# Patient Record
Sex: MALE | Race: WHITE | NOT HISPANIC OR LATINO | URBAN - METROPOLITAN AREA
[De-identification: names, ages, dates, MRNs, and addresses within clinical notes are randomized per-mention and may not be internally consistent; named-entity substitution may affect disease eponyms.]

---

## 2020-07-25 ENCOUNTER — TELEPHONE ENCOUNTER (OUTPATIENT)
Dept: URBAN - METROPOLITAN AREA CLINIC 13 | Facility: CLINIC | Age: 60
End: 2020-07-25

## 2020-07-25 RX ORDER — OMEPRAZOLE 40 MG/1
TAKE (1) CAPSULE TWICE DAILY CAPSULE, DELAYED RELEASE ORAL
Qty: 60 | Refills: 0 | OUTPATIENT
Start: 2015-12-11 | End: 2018-02-22

## 2020-07-25 RX ORDER — DEXLANSOPRAZOLE 60 MG/1
TAKE 1 CAPSULE DAILY CAPSULE, DELAYED RELEASE ORAL
Qty: 30 | Refills: 0 | OUTPATIENT
Start: 2015-11-19 | End: 2015-12-11

## 2020-07-25 RX ORDER — MERCAPTOPURINE 50 MG/1
TAKE 1 TABLET BY MOUTH ONCE DAILY TABLET ORAL
Qty: 30 | Refills: 2 | OUTPATIENT
Start: 2017-11-09 | End: 2019-05-22

## 2020-07-25 RX ORDER — PREDNISONE 10 MG/1
TAKE 1 TABLET DAILY TABLET ORAL
Refills: 0 | OUTPATIENT
End: 2017-11-09

## 2020-07-25 RX ORDER — RABEPRAZOLE SODIUM 20 MG/1
TAKE 1 TABLET DAILY TABLET, DELAYED RELEASE ORAL
Qty: 30 | Refills: 6 | OUTPATIENT
End: 2015-11-18

## 2020-07-25 RX ORDER — MESALAMINE 375 MG/1
TAKE 4 CAPSULES DAILY IN THE MORNING CAPSULE, EXTENDED RELEASE ORAL
Qty: 120 | Refills: 5 | OUTPATIENT
Start: 2015-11-19 | End: 2019-05-01

## 2020-07-25 RX ORDER — MESALAMINE 375 MG/1
TAKE 4 CAPSULES BY MOUTH DAILY IN THE MORNING CAPSULE, EXTENDED RELEASE ORAL
Qty: 360 | Refills: 2 | OUTPATIENT
Start: 2019-05-01 | End: 2020-06-30

## 2020-07-26 ENCOUNTER — TELEPHONE ENCOUNTER (OUTPATIENT)
Dept: URBAN - METROPOLITAN AREA CLINIC 13 | Facility: CLINIC | Age: 60
End: 2020-07-26

## 2020-07-26 RX ORDER — PREDNISONE 5 MG/1
TAKE 2 TABLET DAILY TABLET ORAL
Qty: 60 | Refills: 2 | Status: ACTIVE | COMMUNITY
Start: 2016-06-17

## 2020-07-26 RX ORDER — ATORVASTATIN CALCIUM 40 MG/1
TABLET, FILM COATED ORAL
Refills: 0 | Status: ACTIVE | COMMUNITY
Start: 2017-05-06

## 2020-07-26 RX ORDER — MESALAMINE 1000 MG/1
SUPPOSITORY RECTAL
Qty: 30 | Refills: 0 | Status: ACTIVE | COMMUNITY
Start: 2017-12-16

## 2020-07-26 RX ORDER — DONEPEZIL HYDROCHLORIDE 5 MG/1
TAKE 1 TABLET DAILY AS DIRECTED TABLET, FILM COATED ORAL
Refills: 0 | Status: ACTIVE | COMMUNITY

## 2020-07-26 RX ORDER — HYOSCYAMINE SULFATE 0.12 MG/1
TABLET, ORALLY DISINTEGRATING ORAL
Qty: 90 | Refills: 0 | Status: ACTIVE | COMMUNITY
Start: 2017-07-04

## 2020-07-26 RX ORDER — MESALAMINE 0.38 G/1
TAKE 4 CAPSULES BY MOUTH DAILY IN THE MORNING CAPSULE, EXTENDED RELEASE ORAL
Qty: 360 | Refills: 2 | Status: ACTIVE | COMMUNITY
Start: 2020-06-30

## 2020-07-26 RX ORDER — ICOSAPENT ETHYL 1000 MG/1
CAPSULE ORAL
Refills: 0 | Status: ACTIVE | COMMUNITY
Start: 2017-07-04

## 2020-07-26 RX ORDER — HYOSCYAMINE SULFATE 0.12 MG/1
TABLET, ORALLY DISINTEGRATING ORAL
Qty: 90 | Refills: 0 | Status: ACTIVE | COMMUNITY
Start: 2017-01-29

## 2020-07-26 RX ORDER — DEXLANSOPRAZOLE 60 MG/1
TAKE 1 CAPSULE BY MOUTH ONCE DAILY CAPSULE, DELAYED RELEASE ORAL
Qty: 30 | Refills: 5 | Status: ACTIVE | COMMUNITY
Start: 2016-06-17

## 2020-07-26 RX ORDER — HYDROCORTISONE ACETATE 25 MG/1
USE AS DIRECTED SUPPOSITORY RECTAL
Qty: 12 | Refills: 0 | Status: ACTIVE | COMMUNITY
Start: 2011-11-06

## 2020-07-26 RX ORDER — BENZONATATE 100 MG/1
CAPSULE ORAL
Qty: 60 | Refills: 0 | Status: ACTIVE | COMMUNITY
Start: 2018-01-24

## 2020-07-26 RX ORDER — RABEPRAZOLE SODIUM 20 MG/1
TABLET, DELAYED RELEASE ORAL
Refills: 0 | Status: ACTIVE | COMMUNITY

## 2020-07-26 RX ORDER — OSELTAMIVIR PHOSPHATE 75 MG/1
CAPSULE ORAL
Qty: 10 | Refills: 0 | Status: ACTIVE | COMMUNITY
Start: 2018-01-24

## 2020-07-26 RX ORDER — CIPROFLOXACIN HYDROCHLORIDE 500 MG/1
TABLET, FILM COATED ORAL
Qty: 20 | Refills: 0 | Status: ACTIVE | COMMUNITY
Start: 2017-10-25

## 2020-07-26 RX ORDER — METRONIDAZOLE 500 MG/1
TABLET ORAL
Qty: 30 | Refills: 0 | Status: ACTIVE | COMMUNITY
Start: 2017-10-25

## 2020-07-26 RX ORDER — HYOSCYAMINE SULFATE 0.12 MG/1
TABLET, ORALLY DISINTEGRATING ORAL
Qty: 90 | Refills: 0 | Status: ACTIVE | COMMUNITY
Start: 2017-04-15

## 2020-07-26 RX ORDER — MERCAPTOPURINE 50 MG/1
TAKE 1 TABLET BY MOUTH ONCE DAILY TABLET ORAL
Qty: 30 | Refills: 5 | Status: ACTIVE | COMMUNITY
Start: 2019-05-22

## 2020-07-26 RX ORDER — POLYETHYLENE GLYCOL 3350 AND ELECTROLYTES WITH LEMON FLAVOR 236; 22.74; 6.74; 5.86; 2.97 G/4L; G/4L; G/4L; G/4L; G/4L
POWDER, FOR SOLUTION ORAL
Qty: 255 | Refills: 0 | Status: ACTIVE | COMMUNITY
Start: 2012-08-22

## 2020-07-26 RX ORDER — CLOPIDOGREL 75 MG/1
TABLET ORAL
Refills: 0 | Status: ACTIVE | COMMUNITY
Start: 2017-05-06

## 2020-09-03 ENCOUNTER — TELEPHONE ENCOUNTER (OUTPATIENT)
Dept: URBAN - METROPOLITAN AREA CLINIC 113 | Facility: CLINIC | Age: 60
End: 2020-09-03

## 2020-09-03 VITALS — HEIGHT: 68 IN | BODY MASS INDEX: 22.28 KG/M2 | WEIGHT: 147 LBS

## 2020-09-03 RX ORDER — DONEPEZIL HYDROCHLORIDE 5 MG/1
TAKE 1 TABLET DAILY AS DIRECTED TABLET, FILM COATED ORAL
Refills: 0 | Status: ON HOLD | COMMUNITY

## 2020-09-03 RX ORDER — POLYETHYLENE GLYCOL 3350 AND ELECTROLYTES WITH LEMON FLAVOR 236; 22.74; 6.74; 5.86; 2.97 G/4L; G/4L; G/4L; G/4L; G/4L
POWDER, FOR SOLUTION ORAL
Qty: 255 | Refills: 0 | Status: ON HOLD | COMMUNITY
Start: 2012-08-22

## 2020-09-03 RX ORDER — PREDNISONE 5 MG/1
TAKE 2 TABLET DAILY TABLET ORAL
Qty: 60 | Refills: 2 | Status: ACTIVE | COMMUNITY
Start: 2016-06-17

## 2020-09-03 RX ORDER — HYDROCORTISONE ACETATE 25 MG/1
USE AS DIRECTED SUPPOSITORY RECTAL
Qty: 12 | Refills: 0 | Status: ON HOLD | COMMUNITY
Start: 2011-11-06

## 2020-09-03 RX ORDER — MESALAMINE 1000 MG/1
SUPPOSITORY RECTAL
Qty: 30 | Refills: 0 | Status: ON HOLD | COMMUNITY
Start: 2017-12-16

## 2020-09-03 RX ORDER — CLOPIDOGREL 75 MG/1
TABLET ORAL
Refills: 0 | Status: ACTIVE | COMMUNITY
Start: 2017-05-06

## 2020-09-03 RX ORDER — DEXLANSOPRAZOLE 60 MG/1
TAKE 1 CAPSULE BY MOUTH ONCE DAILY CAPSULE, DELAYED RELEASE ORAL
Qty: 30 | Refills: 5 | Status: ON HOLD | COMMUNITY
Start: 2016-06-17

## 2020-09-03 RX ORDER — RABEPRAZOLE SODIUM 20 MG/1
TABLET, DELAYED RELEASE ORAL
Refills: 0 | Status: ACTIVE | COMMUNITY

## 2020-09-03 RX ORDER — METRONIDAZOLE 500 MG/1
TABLET ORAL
Qty: 30 | Refills: 0 | Status: ON HOLD | COMMUNITY
Start: 2017-10-25

## 2020-09-03 RX ORDER — MERCAPTOPURINE 50 MG/1
TAKE 1 TABLET BY MOUTH ONCE DAILY TABLET ORAL
Qty: 30 | Refills: 5 | Status: ACTIVE | COMMUNITY
Start: 2019-05-22

## 2020-09-03 RX ORDER — MESALAMINE 0.38 G/1
TAKE 4 CAPSULES BY MOUTH DAILY IN THE MORNING CAPSULE, EXTENDED RELEASE ORAL
Qty: 360 | Refills: 2 | Status: ON HOLD | COMMUNITY
Start: 2020-06-30

## 2020-09-03 RX ORDER — CIPROFLOXACIN HYDROCHLORIDE 500 MG/1
TABLET, FILM COATED ORAL
Qty: 20 | Refills: 0 | Status: ON HOLD | COMMUNITY
Start: 2017-10-25

## 2020-09-03 RX ORDER — OSELTAMIVIR PHOSPHATE 75 MG/1
CAPSULE ORAL
Qty: 10 | Refills: 0 | Status: ON HOLD | COMMUNITY
Start: 2018-01-24

## 2020-09-03 RX ORDER — ATORVASTATIN CALCIUM 40 MG/1
TABLET, FILM COATED ORAL
Refills: 0 | Status: ON HOLD | COMMUNITY
Start: 2017-05-06

## 2020-09-03 RX ORDER — EVOLOCUMAB 140 MG/ML
1 ML INJECTION, SOLUTION SUBCUTANEOUS
Status: ACTIVE | COMMUNITY

## 2020-09-03 RX ORDER — BENZONATATE 100 MG/1
CAPSULE ORAL
Qty: 60 | Refills: 0 | Status: ON HOLD | COMMUNITY
Start: 2018-01-24

## 2020-09-03 RX ORDER — ICOSAPENT ETHYL 1000 MG/1
CAPSULE ORAL
Refills: 0 | Status: ACTIVE | COMMUNITY
Start: 2017-07-04

## 2020-09-03 RX ORDER — HYOSCYAMINE SULFATE 0.12 MG/1
TABLET, ORALLY DISINTEGRATING ORAL
Qty: 90 | Refills: 0 | Status: ON HOLD | COMMUNITY
Start: 2017-01-29

## 2020-09-03 RX ORDER — MESALAMINE 375 MG/1
4 CAPSULES IN THE MORNING CAPSULE, EXTENDED RELEASE ORAL ONCE A DAY
Qty: 120 CAPSULE | Status: ACTIVE | COMMUNITY

## 2020-09-16 ENCOUNTER — TELEPHONE ENCOUNTER (OUTPATIENT)
Dept: URBAN - METROPOLITAN AREA CLINIC 113 | Facility: CLINIC | Age: 60
End: 2020-09-16

## 2020-09-16 ENCOUNTER — LAB OUTSIDE AN ENCOUNTER (OUTPATIENT)
Dept: URBAN - METROPOLITAN AREA CLINIC 113 | Facility: CLINIC | Age: 60
End: 2020-09-16

## 2020-09-16 RX ORDER — SODIUM, POTASSIUM,MAG SULFATES 17.5-3.13G
354ML SOLUTION, RECONSTITUTED, ORAL ORAL
Qty: 354 MILLILITER | Refills: 0 | OUTPATIENT
Start: 2020-09-16 | End: 2020-09-17

## 2020-09-17 ENCOUNTER — OFFICE VISIT (OUTPATIENT)
Dept: URBAN - METROPOLITAN AREA SURGERY CENTER 25 | Facility: SURGERY CENTER | Age: 60
End: 2020-09-17
Payer: COMMERCIAL

## 2020-09-17 ENCOUNTER — CLAIMS CREATED FROM THE CLAIM WINDOW (OUTPATIENT)
Dept: URBAN - METROPOLITAN AREA CLINIC 4 | Facility: CLINIC | Age: 60
End: 2020-09-17
Payer: COMMERCIAL

## 2020-09-17 DIAGNOSIS — K51.80 OTHER ULCERATIVE COLITIS WITHOUT COMPLICATIONS: ICD-10-CM

## 2020-09-17 DIAGNOSIS — D12.5 BENIGN NEOPLASM OF SIGMOID COLON: ICD-10-CM

## 2020-09-17 DIAGNOSIS — K63.5 BENIGN COLON POLYP: ICD-10-CM

## 2020-09-17 DIAGNOSIS — K51.00 CHRONIC PANCOLONIC ULCERATIVE COLITIS: ICD-10-CM

## 2020-09-17 PROCEDURE — 45380 COLONOSCOPY AND BIOPSY: CPT | Performed by: INTERNAL MEDICINE

## 2020-09-17 PROCEDURE — 88305 TISSUE EXAM BY PATHOLOGIST: CPT | Performed by: PATHOLOGY

## 2020-09-17 PROCEDURE — G9937 DIG OR SURV COLSCO: HCPCS | Performed by: INTERNAL MEDICINE

## 2020-09-17 PROCEDURE — G8907 PT DOC NO EVENTS ON DISCHARG: HCPCS | Performed by: INTERNAL MEDICINE

## 2020-09-17 PROCEDURE — 45385 COLONOSCOPY W/LESION REMOVAL: CPT | Performed by: INTERNAL MEDICINE

## 2021-02-02 ENCOUNTER — ERX REFILL RESPONSE (OUTPATIENT)
Age: 61
End: 2021-02-02

## 2021-02-02 RX ORDER — MESALAMINE 375 MG/1
TAKE 4 CAPSULES BY MOUTH DAILY IN THE MORNING CAPSULE, EXTENDED RELEASE ORAL
Qty: 360 | Refills: 2 | OUTPATIENT

## 2021-02-02 RX ORDER — MESALAMINE 375 MG/1
TAKE 4 CAPSULES BY MOUTH DAILY IN THE MORNING CAPSULE, EXTENDED RELEASE ORAL
Qty: 360 CAPSULE | Refills: 2 | OUTPATIENT

## 2021-02-03 ENCOUNTER — ERX REFILL RESPONSE (OUTPATIENT)
Age: 61
End: 2021-02-03

## 2021-02-04 ENCOUNTER — ERX REFILL RESPONSE (OUTPATIENT)
Age: 61
End: 2021-02-04

## 2021-02-10 ENCOUNTER — ERX REFILL RESPONSE (OUTPATIENT)
Age: 61
End: 2021-02-10

## 2021-02-10 RX ORDER — MESALAMINE 375 MG/1
TAKE 4 CAPSULES BY MOUTH DAILY IN THE MORNING CAPSULE, EXTENDED RELEASE ORAL
Qty: 360 CAPSULE | Refills: 2 | OUTPATIENT

## 2021-02-10 RX ORDER — MESALAMINE 0.38 G/1
TAKE 4 CAPSULES BY MOUTH DAILY IN THE MORNING CAPSULE, EXTENDED RELEASE ORAL
Qty: 360 | Refills: 2 | OUTPATIENT

## 2021-02-19 ENCOUNTER — ERX REFILL RESPONSE (OUTPATIENT)
Age: 61
End: 2021-02-19

## 2021-02-19 RX ORDER — MESALAMINE 375 MG/1
TAKE 4 CAPSULES BY MOUTH DAILY IN THE MORNING CAPSULE, EXTENDED RELEASE ORAL
Qty: 360 CAPSULE | Refills: 2 | OUTPATIENT

## 2021-02-19 RX ORDER — MESALAMINE 0.38 G/1
TAKE 4 CAPSULES BY MOUTH DAILY IN THE MORNING CAPSULE, EXTENDED RELEASE ORAL
Qty: 360 | Refills: 2 | OUTPATIENT

## 2021-03-02 ENCOUNTER — TELEPHONE ENCOUNTER (OUTPATIENT)
Dept: URBAN - METROPOLITAN AREA CLINIC 23 | Facility: CLINIC | Age: 61
End: 2021-03-02

## 2021-03-02 RX ORDER — MESALAMINE 375 MG/1
TAKE 4 CAPSULES BY MOUTH DAILY IN THE MORNING CAPSULE, EXTENDED RELEASE ORAL
Qty: 360 CAPSULE | Refills: 2

## 2021-03-08 ENCOUNTER — ERX REFILL RESPONSE (OUTPATIENT)
Age: 61
End: 2021-03-08

## 2021-03-11 ENCOUNTER — TELEPHONE ENCOUNTER (OUTPATIENT)
Dept: URBAN - METROPOLITAN AREA CLINIC 113 | Facility: CLINIC | Age: 61
End: 2021-03-11

## 2021-03-11 RX ORDER — MESALAMINE 375 MG/1
4 CAPSULES IN THE MORNING CAPSULE, EXTENDED RELEASE ORAL ONCE A DAY
Qty: 360 CAPSULE | Refills: 3 | OUTPATIENT
End: 2022-03-06

## 2021-03-11 RX ORDER — MESALAMINE 375 MG/1
TAKE 4 CAPSULES BY MOUTH DAILY IN THE MORNING CAPSULE, EXTENDED RELEASE ORAL
Refills: 2
End: 2021-12-06

## 2021-03-15 ENCOUNTER — ERX REFILL RESPONSE (OUTPATIENT)
Age: 61
End: 2021-03-15

## 2021-03-15 RX ORDER — MESALAMINE 375 MG/1
TAKE 4 CAPSULES BY MOUTH EVERY MORNING CAPSULE, EXTENDED RELEASE ORAL
Qty: 360 CAPSULE | Refills: 2 | OUTPATIENT

## 2021-03-15 RX ORDER — MESALAMINE 375 MG/1
TAKE 4 CAPSULES BY MOUTH EVERY MORNING CAPSULE, EXTENDED RELEASE ORAL
Qty: 360 | Refills: 2 | OUTPATIENT

## 2021-03-18 ENCOUNTER — ERX REFILL RESPONSE (OUTPATIENT)
Dept: URBAN - METROPOLITAN AREA CLINIC 113 | Facility: CLINIC | Age: 61
End: 2021-03-18

## 2021-03-18 ENCOUNTER — ERX REFILL RESPONSE (OUTPATIENT)
Age: 61
End: 2021-03-18

## 2021-03-18 RX ORDER — MESALAMINE 0.38 G/1
TAKE 4 CAPSULES BY MOUTH EVERY MORNING CAPSULE, EXTENDED RELEASE ORAL
Qty: 360 CAPSULE | Refills: 3 | OUTPATIENT

## 2021-03-18 RX ORDER — MESALAMINE 375 MG/1
4 CAPSULES IN THE MORNING CAPSULE, EXTENDED RELEASE ORAL ONCE A DAY
Qty: 360 | Refills: 3 | OUTPATIENT

## 2021-05-20 ENCOUNTER — OFFICE VISIT (OUTPATIENT)
Dept: URBAN - METROPOLITAN AREA CLINIC 113 | Facility: CLINIC | Age: 61
End: 2021-05-20
Payer: COMMERCIAL

## 2021-05-20 DIAGNOSIS — D12.5 BENIGN NEOPLASM OF SIGMOID COLON: ICD-10-CM

## 2021-05-20 DIAGNOSIS — K51.80 OTHER ULCERATIVE COLITIS WITHOUT COMPLICATIONS: ICD-10-CM

## 2021-05-20 DIAGNOSIS — K22.70 BARRETT'S ESOPHAGUS DETERMINED BY ENDOSCOPY: ICD-10-CM

## 2021-05-20 DIAGNOSIS — R07.89 ATYPICAL CHEST PAIN: ICD-10-CM

## 2021-05-20 DIAGNOSIS — K21.9 GERD WITHOUT ESOPHAGITIS: ICD-10-CM

## 2021-05-20 PROCEDURE — 99214 OFFICE O/P EST MOD 30 MIN: CPT | Performed by: INTERNAL MEDICINE

## 2021-05-20 RX ORDER — DONEPEZIL HYDROCHLORIDE 5 MG/1
1 TABLET ON THE TONGUE AND ALLOW TO DISSOLVE AT BEDTIME TABLET, FILM COATED ORAL ONCE A DAY
Refills: 0 | Status: ACTIVE | COMMUNITY

## 2021-05-20 RX ORDER — BENZONATATE 100 MG/1
CAPSULE ORAL
Qty: 60 | Refills: 0 | Status: DISCONTINUED | COMMUNITY
Start: 2018-01-24

## 2021-05-20 RX ORDER — CLOPIDOGREL 75 MG/1
1 TABLET TABLET ORAL ONCE A DAY
Refills: 0 | Status: ACTIVE | COMMUNITY
Start: 2017-05-06

## 2021-05-20 RX ORDER — MERCAPTOPURINE 50 MG/1
TAKE 1 TABLET BY MOUTH ONCE DAILY TABLET ORAL
Qty: 30 | Refills: 5 | Status: ACTIVE | COMMUNITY
Start: 2019-05-22

## 2021-05-20 RX ORDER — HYOSCYAMINE SULFATE 0.12 MG/1
TABLET, ORALLY DISINTEGRATING ORAL
Qty: 90 | Refills: 0 | Status: DISCONTINUED | COMMUNITY
Start: 2017-01-29

## 2021-05-20 RX ORDER — CIPROFLOXACIN HYDROCHLORIDE 500 MG/1
TABLET, FILM COATED ORAL
Qty: 20 | Refills: 0 | Status: DISCONTINUED | COMMUNITY
Start: 2017-10-25

## 2021-05-20 RX ORDER — HYDROCORTISONE ACETATE 25 MG/1
USE AS DIRECTED SUPPOSITORY RECTAL
Qty: 12 | Refills: 0 | Status: DISCONTINUED | COMMUNITY
Start: 2011-11-06

## 2021-05-20 RX ORDER — RABEPRAZOLE SODIUM 20 MG/1
1 TABLET TABLET, DELAYED RELEASE ORAL ONCE A DAY
Refills: 0 | Status: ACTIVE | COMMUNITY

## 2021-05-20 RX ORDER — METRONIDAZOLE 500 MG/1
TABLET ORAL
Qty: 30 | Refills: 0 | Status: DISCONTINUED | COMMUNITY
Start: 2017-10-25

## 2021-05-20 RX ORDER — MESALAMINE 375 MG/1
TAKE 4 CAPSULES BY MOUTH DAILY IN THE MORNING CAPSULE, EXTENDED RELEASE ORAL
Qty: 360 CAPSULE | Refills: 2 | Status: DISCONTINUED | COMMUNITY

## 2021-05-20 RX ORDER — ATORVASTATIN CALCIUM 40 MG/1
1 TABLET TABLET, FILM COATED ORAL ONCE A DAY
Refills: 0 | Status: DISCONTINUED | COMMUNITY
Start: 2017-05-06

## 2021-05-20 RX ORDER — MESALAMINE 1000 MG/1
SUPPOSITORY RECTAL
Qty: 30 | Refills: 0 | Status: DISCONTINUED | COMMUNITY
Start: 2017-12-16

## 2021-05-20 RX ORDER — PREDNISONE 5 MG/1
TAKE 2 TABLET DAILY TABLET ORAL
Qty: 60 | Refills: 2 | Status: DISCONTINUED | COMMUNITY
Start: 2016-06-17

## 2021-05-20 RX ORDER — POLYETHYLENE GLYCOL 3350 AND ELECTROLYTES WITH LEMON FLAVOR 236; 22.74; 6.74; 5.86; 2.97 G/4L; G/4L; G/4L; G/4L; G/4L
POWDER, FOR SOLUTION ORAL
Qty: 255 | Refills: 0 | Status: DISCONTINUED | COMMUNITY
Start: 2012-08-22

## 2021-05-20 RX ORDER — ICOSAPENT ETHYL 1000 MG/1
CAPSULE ORAL
Refills: 0 | Status: DISCONTINUED | COMMUNITY
Start: 2017-07-04

## 2021-05-20 RX ORDER — EVOLOCUMAB 140 MG/ML
1 ML INJECTION, SOLUTION SUBCUTANEOUS EVERY 2 WEEKS
Status: ACTIVE | COMMUNITY

## 2021-05-20 RX ORDER — MESALAMINE 0.38 G/1
TAKE 4 CAPSULES BY MOUTH EVERY MORNING CAPSULE, EXTENDED RELEASE ORAL
Qty: 360 CAPSULE | Refills: 3 | Status: ACTIVE | COMMUNITY

## 2021-05-20 RX ORDER — OSELTAMIVIR PHOSPHATE 75 MG/1
CAPSULE ORAL
Qty: 10 | Refills: 0 | Status: DISCONTINUED | COMMUNITY
Start: 2018-01-24

## 2021-05-20 RX ORDER — MESALAMINE 375 MG/1
TAKE 4 CAPSULES BY MOUTH DAILY IN THE MORNING CAPSULE, EXTENDED RELEASE ORAL
Refills: 2 | Status: DISCONTINUED | COMMUNITY
End: 2021-12-06

## 2021-05-20 RX ORDER — DEXLANSOPRAZOLE 60 MG/1
TAKE 1 CAPSULE BY MOUTH ONCE DAILY CAPSULE, DELAYED RELEASE ORAL
Qty: 30 | Refills: 5 | Status: DISCONTINUED | COMMUNITY
Start: 2016-06-17

## 2021-05-20 NOTE — HPI-TODAY'S VISIT:
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 He was last seen here on November 9, 2017 after a 2 year absence.  In 2015, Dr. Tinajero had been placed on a regimen of Apriso 0.375 mg 4 capsules daily. He had previously not been on a maintenance regimen for several years, and had been self-medicating on an as-needed basis with prednisone. We repeated his colonoscopy on December 3, 2015 and this showed inflammation from the anus to the ascending colon with biopsy showing no dysplasia. He did have 1 tubular adenoma removed at that time. Upper endoscopy was also done on December 3, 2015 and this showed a hiatal hernia and gastritis. These findings were consistent with those from his prior upper endoscopy in 2005.  Notably, the patient had several coronary artery stents placed by Dr. Singh in May 2017 and is now on Plavix. Of note, his mother does have a history of coronary artery disease and had a myocardial infarction in the past.   In early October 2017, the patient developed acute left-sided abdominal pain which was severe, and a CT scan showed inflammation in the left lower quadrant with an abscess. This was felt to be due to a localized perforation. The abscess was not drained, and the patient was placed on ciprofloxacin and Flagyl. Repeat CT scan on November 9, 2017 showed virtual resolution of that abscess. He felt much better at that time. We discussed biologic agents, immunosuppressives, etc. We did discuss possible surgical intervention (segmental surgical colectomy by Dr. Santos) as well. We also talked about the need to get a surveillance colonoscopy, although we elected to defer this until he was a few months further out from his coronary event. We did give him a lab slip to have lab work performed, but this laboratory was not done. Ultimately, we elected to place him on 6-mercaptopurine 50 mg a day. He called me in the interim with complaints of left lower quadrant pain. A repeat CT scan showed colitis, but no abscess. I put him back on prednisone 30 mg a day, and his symptoms completely abated.   The patient had a surveillance colonoscopy Performed on September 17, 2020.  This showed a 6 mm hyperplastic polyp in the sigmoid, pseudopolyps in the hepatic flexure, sigmoid colon diverticulosis, but no active colitis.  Random colon biopsies showed patchy mildly active colitis without dysplasia.  The patient recently has been having episodes of atypical chest pain and epigastric pain which are quite severe.  He had another episode of this type pain 2 weeks ago, and underwent cardiac catheterization by Dr. Pollack.  All of his stents were open, and there is no evidence of any significant active coronary artery disease.  He did have some right wrist pain after the cardiac catheterization, which is gradually resolving.  He is currently maintained on 6-MP 50 mg daily and Apriso, and this has been successful in maintaining remission.  He takes AcipHex 20 mg twice daily for reflux.  His last upper endoscopy for [facility]ova's surveillance was in 2017, and he would like to schedule a repeat upper endoscopy at this point.

## 2021-05-27 ENCOUNTER — OFFICE VISIT (OUTPATIENT)
Dept: URBAN - METROPOLITAN AREA SURGERY CENTER 25 | Facility: SURGERY CENTER | Age: 61
End: 2021-05-27
Payer: COMMERCIAL

## 2021-05-27 ENCOUNTER — CLAIMS CREATED FROM THE CLAIM WINDOW (OUTPATIENT)
Dept: URBAN - METROPOLITAN AREA CLINIC 4 | Facility: CLINIC | Age: 61
End: 2021-05-27
Payer: COMMERCIAL

## 2021-05-27 DIAGNOSIS — K21.9 GASTRO-ESOPHAGEAL REFLUX DISEASE WITHOUT ESOPHAGITIS: ICD-10-CM

## 2021-05-27 DIAGNOSIS — K22.70 BARRETT ESOPHAGUS: ICD-10-CM

## 2021-05-27 DIAGNOSIS — K21.9 ACID REFLUX: ICD-10-CM

## 2021-05-27 PROCEDURE — 43239 EGD BIOPSY SINGLE/MULTIPLE: CPT | Performed by: INTERNAL MEDICINE

## 2021-05-27 PROCEDURE — G8907 PT DOC NO EVENTS ON DISCHARG: HCPCS | Performed by: INTERNAL MEDICINE

## 2021-05-27 PROCEDURE — 88305 TISSUE EXAM BY PATHOLOGIST: CPT | Performed by: PATHOLOGY

## 2021-05-27 RX ORDER — MESALAMINE 0.38 G/1
TAKE 4 CAPSULES BY MOUTH EVERY MORNING CAPSULE, EXTENDED RELEASE ORAL
Qty: 360 CAPSULE | Refills: 3 | Status: ACTIVE | COMMUNITY

## 2021-05-27 RX ORDER — RABEPRAZOLE SODIUM 20 MG/1
1 TABLET TABLET, DELAYED RELEASE ORAL ONCE A DAY
Refills: 0 | Status: ACTIVE | COMMUNITY

## 2021-05-27 RX ORDER — MERCAPTOPURINE 50 MG/1
TAKE 1 TABLET BY MOUTH ONCE DAILY TABLET ORAL
Qty: 30 | Refills: 5 | Status: ACTIVE | COMMUNITY
Start: 2019-05-22

## 2021-05-27 RX ORDER — EVOLOCUMAB 140 MG/ML
1 ML INJECTION, SOLUTION SUBCUTANEOUS EVERY 2 WEEKS
Status: ACTIVE | COMMUNITY

## 2021-05-27 RX ORDER — CLOPIDOGREL 75 MG/1
1 TABLET TABLET ORAL ONCE A DAY
Refills: 0 | Status: ACTIVE | COMMUNITY
Start: 2017-05-06

## 2021-05-27 RX ORDER — DONEPEZIL HYDROCHLORIDE 5 MG/1
1 TABLET ON THE TONGUE AND ALLOW TO DISSOLVE AT BEDTIME TABLET, FILM COATED ORAL ONCE A DAY
Refills: 0 | Status: ACTIVE | COMMUNITY

## 2021-07-19 ENCOUNTER — OFFICE VISIT (OUTPATIENT)
Dept: URBAN - METROPOLITAN AREA CLINIC 113 | Facility: CLINIC | Age: 61
End: 2021-07-19

## 2021-07-19 RX ORDER — MERCAPTOPURINE 50 MG/1
TAKE 1 TABLET BY MOUTH ONCE DAILY TABLET ORAL
Qty: 30 | Refills: 5 | Status: ACTIVE | COMMUNITY
Start: 2019-05-22

## 2021-07-19 RX ORDER — MESALAMINE 0.38 G/1
TAKE 4 CAPSULES BY MOUTH EVERY MORNING CAPSULE, EXTENDED RELEASE ORAL
Qty: 360 CAPSULE | Refills: 3 | Status: ACTIVE | COMMUNITY

## 2021-07-19 RX ORDER — CLOPIDOGREL 75 MG/1
1 TABLET TABLET ORAL ONCE A DAY
Refills: 0 | Status: ACTIVE | COMMUNITY
Start: 2017-05-06

## 2021-07-19 RX ORDER — EVOLOCUMAB 140 MG/ML
1 ML INJECTION, SOLUTION SUBCUTANEOUS EVERY 2 WEEKS
Status: ACTIVE | COMMUNITY

## 2021-07-19 RX ORDER — RABEPRAZOLE SODIUM 20 MG/1
1 TABLET TABLET, DELAYED RELEASE ORAL ONCE A DAY
Refills: 0 | Status: ACTIVE | COMMUNITY

## 2021-07-19 RX ORDER — DONEPEZIL HYDROCHLORIDE 5 MG/1
1 TABLET ON THE TONGUE AND ALLOW TO DISSOLVE AT BEDTIME TABLET, FILM COATED ORAL ONCE A DAY
Refills: 0 | Status: ACTIVE | COMMUNITY

## 2021-08-11 ENCOUNTER — ERX REFILL RESPONSE (OUTPATIENT)
Dept: URBAN - METROPOLITAN AREA CLINIC 113 | Facility: CLINIC | Age: 61
End: 2021-08-11

## 2021-08-11 RX ORDER — MESALAMINE 375 MG/1
TAKE 4 CAPSULES BY MOUTH DAILY IN THE MORNING CAPSULE, EXTENDED RELEASE ORAL
Qty: 360 CAPSULE | Refills: 2 | OUTPATIENT

## 2022-01-22 ENCOUNTER — ERX REFILL RESPONSE (OUTPATIENT)
Dept: URBAN - METROPOLITAN AREA CLINIC 113 | Facility: CLINIC | Age: 62
End: 2022-01-22

## 2022-01-22 RX ORDER — MESALAMINE 375 MG/1
TAKE 4 CAPSULES BY MOUTH EVERY MORNING CAPSULE, EXTENDED RELEASE ORAL
Qty: 360 CAPSULE | Refills: 3 | OUTPATIENT

## 2022-10-18 ENCOUNTER — ERX REFILL RESPONSE (OUTPATIENT)
Dept: URBAN - METROPOLITAN AREA CLINIC 113 | Facility: CLINIC | Age: 62
End: 2022-10-18

## 2022-10-18 RX ORDER — MESALAMINE 0.38 G/1
TAKE 4 CAPSULES BY MOUTH EVERY MORNING CAPSULE, EXTENDED RELEASE ORAL
Qty: 360 CAPSULE | Refills: 2 | OUTPATIENT

## 2022-10-18 RX ORDER — MESALAMINE 375 MG/1
TAKE 4 CAPSULES BY MOUTH EVERY MORNING CAPSULE, EXTENDED RELEASE ORAL
Qty: 360 CAPSULE | Refills: 3 | OUTPATIENT

## 2022-11-17 ENCOUNTER — WEB ENCOUNTER (OUTPATIENT)
Dept: URBAN - METROPOLITAN AREA CLINIC 113 | Facility: CLINIC | Age: 62
End: 2022-11-17

## 2022-11-17 ENCOUNTER — OFFICE VISIT (OUTPATIENT)
Dept: URBAN - METROPOLITAN AREA CLINIC 113 | Facility: CLINIC | Age: 62
End: 2022-11-17
Payer: COMMERCIAL

## 2022-11-17 ENCOUNTER — LAB OUTSIDE AN ENCOUNTER (OUTPATIENT)
Dept: URBAN - METROPOLITAN AREA CLINIC 113 | Facility: CLINIC | Age: 62
End: 2022-11-17

## 2022-11-17 VITALS
DIASTOLIC BLOOD PRESSURE: 70 MMHG | SYSTOLIC BLOOD PRESSURE: 104 MMHG | BODY MASS INDEX: 24.1 KG/M2 | WEIGHT: 159 LBS | RESPIRATION RATE: 20 BRPM | HEIGHT: 68 IN | TEMPERATURE: 97.8 F | HEART RATE: 59 BPM

## 2022-11-17 DIAGNOSIS — R07.89 ATYPICAL CHEST PAIN: ICD-10-CM

## 2022-11-17 DIAGNOSIS — K21.9 GERD WITHOUT ESOPHAGITIS: ICD-10-CM

## 2022-11-17 DIAGNOSIS — D12.5 BENIGN NEOPLASM OF SIGMOID COLON: ICD-10-CM

## 2022-11-17 DIAGNOSIS — K51.80 OTHER ULCERATIVE COLITIS WITHOUT COMPLICATIONS: ICD-10-CM

## 2022-11-17 DIAGNOSIS — K22.70 BARRETT'S ESOPHAGUS DETERMINED BY ENDOSCOPY: ICD-10-CM

## 2022-11-17 PROBLEM — 302914006: Status: ACTIVE | Noted: 2021-05-20

## 2022-11-17 PROBLEM — 266435005: Status: ACTIVE | Noted: 2021-05-20

## 2022-11-17 PROCEDURE — 99214 OFFICE O/P EST MOD 30 MIN: CPT | Performed by: INTERNAL MEDICINE

## 2022-11-17 RX ORDER — EVOLOCUMAB 140 MG/ML
1 ML INJECTION, SOLUTION SUBCUTANEOUS EVERY 2 WEEKS
Status: ACTIVE | COMMUNITY

## 2022-11-17 RX ORDER — MESALAMINE 0.38 G/1
TAKE 4 CAPSULES BY MOUTH EVERY MORNING CAPSULE, EXTENDED RELEASE ORAL
Qty: 360 CAPSULE | Refills: 2 | Status: ACTIVE | COMMUNITY

## 2022-11-17 RX ORDER — RABEPRAZOLE SODIUM 20 MG/1
1 TABLET TABLET, DELAYED RELEASE ORAL TWICE A DAY
Refills: 0 | Status: ACTIVE | COMMUNITY

## 2022-11-17 RX ORDER — DONEPEZIL HYDROCHLORIDE 5 MG/1
1 TABLET ON THE TONGUE AND ALLOW TO DISSOLVE AT BEDTIME TABLET, FILM COATED ORAL ONCE A DAY
Refills: 0 | Status: ACTIVE | COMMUNITY

## 2022-11-17 RX ORDER — CLOPIDOGREL 75 MG/1
1 TABLET TABLET ORAL ONCE A DAY
Refills: 0 | Status: ACTIVE | COMMUNITY
Start: 2017-05-06

## 2022-11-17 RX ORDER — BEMPEDOIC ACID AND EZETIMIBE 180; 10 MG/1; MG/1
1 TABLET TABLET, FILM COATED ORAL ONCE A DAY
Status: ACTIVE | COMMUNITY

## 2022-11-17 RX ORDER — MERCAPTOPURINE 50 MG/1
TAKE 1 TABLET BY MOUTH ONCE DAILY TABLET ORAL
Qty: 30 | Refills: 5 | Status: ACTIVE | COMMUNITY
Start: 2019-05-22

## 2022-11-17 NOTE — HPI-TODAY'S VISIT:
Reese Tinajero is a 62-year-old  male with history of left sided ulcerative colitis (versus Crohn's colitis) initially diagnosed in the 1990s who returns to the office today for follow-up.  He wa slast seen here in a telemedicine visit on 5/20/21.    He was previously seen here on November 9, 2017 after a 2 year absence.  In 2015, DrJr Tinajero had been placed on a regimen of Apriso 0.375 mg 4 capsules daily. He had previously not been on a maintenance regimen for several years, and had been self-medicating on an as-needed basis with prednisone. We repeated his colonoscopy on December 3, 2015 and this showed inflammation from the anus to the ascending colon with biopsy showing no dysplasia. He did have 1 tubular adenoma removed at that time. Upper endoscopy was also done on December 3, 2015 and this showed a hiatal hernia and gastritis. These findings were consistent with those from his prior upper endoscopy in 2005.  Notably, the patient had several coronary artery stents placed by Dr. Singh in May 2017 and is now on Plavix.  His mother does have a history of coronary artery disease and had a myocardial infarction in the past.   In early October 2017, the patient developed acute left-sided abdominal pain which was severe, and a CT scan showed inflammation in the left lower quadrant with an abscess. This was felt to be due to a localized perforation. The abscess was not drained, and the patient was placed on ciprofloxacin and Flagyl. Repeat CT scan on November 9, 2017 showed virtual resolution of that abscess. He felt much better at that time. We discussed biologic agents, immunosuppressives, etc. We did discuss possible surgical intervention (segmental surgical colectomy by Dr. Santos) as well. We also talked about the need to get a surveillance colonoscopy, although we elected to defer this until he was a few months further out from his coronary event. We did give him a lab slip to have lab work performed, but this laboratory was not done. Ultimately, we elected to place him on 6-mercaptopurine 50 mg a day. He called me in the interim with complaints of left lower quadrant pain. A repeat CT scan showed colitis, but no abscess. I put him back on prednisone 30 mg a day, and his symptoms completely abated.   The patient had a surveillance colonoscopy performed on September 17, 2020.  This showed a 6 mm hyperplastic polyp in the sigmoid, pseudopolyps in the hepatic flexure, sigmoid colon diverticulosis, but no active colitis.  Random colon biopsies showed patchy mildly active colitis without dysplasia.  At the time of his 5/20/221 visit, he had been having episodes of atypical chest and epigastric pain which were quite severe.  He had cardiac catheterization by Dr. Nowak and all of his stents were open; there was no evidence of significant active coronary artery disease.    He was being currently maintained on 6-MP 50 mg daily and Apriso, and this regimen was successful in maintaining remission.  He ws taking AcipHex 20 mg twice daily for reflux.  His last upper endoscopy for [facility]'s surveillance had been in 2017, so he was scheduled for a repeat upper endoscopy. This was done on 5/27/21 and showed an irregula rZ-line; biopsies hsowed reflux-type changes bu no Cordova's and no dysplasia.   He did not come for his post-procedure follow up visit.  The patient recently developed lower abdominal pain, and was noted to have a white blood cell count of 16,000 with a left shift.  He was given IV fluids, a gram of ceftriaxone IV, and was placed on oral ciprofloxacin and Flagyl.  A CT scan of the abdomen and pelvis showed thickening of the cecal area focally, consistent with the patient's abdominal pain complaints.  The possibility of Crohn's was raised, although neoplasm not excluded.  He is currently doing better.  He has minimal abdominal pain at present, no diarrhea, no fever, chills, or sweats.  He did stop taking the Flagyl after yesterday's dose because of a metallic taste in his mouth and other mild GI side effects.  He is still taking Cipro.

## 2023-01-04 PROBLEM — 64766004 ULCERATIVE COLITIS: Status: ACTIVE | Noted: 2021-05-20

## 2023-02-01 ENCOUNTER — OUT OF OFFICE VISIT (OUTPATIENT)
Dept: URBAN - METROPOLITAN AREA SURGERY CENTER 25 | Facility: SURGERY CENTER | Age: 63
End: 2023-02-01

## 2023-02-01 ENCOUNTER — CLAIMS CREATED FROM THE CLAIM WINDOW (OUTPATIENT)
Dept: URBAN - METROPOLITAN AREA CLINIC 4 | Facility: CLINIC | Age: 63
End: 2023-02-01
Payer: COMMERCIAL

## 2023-02-01 ENCOUNTER — CLAIMS CREATED FROM THE CLAIM WINDOW (OUTPATIENT)
Dept: URBAN - METROPOLITAN AREA SURGERY CENTER 25 | Facility: SURGERY CENTER | Age: 63
End: 2023-02-01
Payer: COMMERCIAL

## 2023-02-01 ENCOUNTER — TELEPHONE ENCOUNTER (OUTPATIENT)
Dept: URBAN - METROPOLITAN AREA CLINIC 113 | Facility: CLINIC | Age: 63
End: 2023-02-01

## 2023-02-01 DIAGNOSIS — K63.5 HYPERPLASTIC POLYP OF SIGMOID COLON: ICD-10-CM

## 2023-02-01 DIAGNOSIS — K63.89 OTHER SPECIFIED DISEASES OF INTESTINE: ICD-10-CM

## 2023-02-01 DIAGNOSIS — K52.3 COLITIS, INDETERMINATE: ICD-10-CM

## 2023-02-01 PROCEDURE — 88305 TISSUE EXAM BY PATHOLOGIST: CPT | Performed by: PATHOLOGY

## 2023-02-01 PROCEDURE — 45385 COLONOSCOPY W/LESION REMOVAL: CPT | Performed by: INTERNAL MEDICINE

## 2023-02-01 PROCEDURE — G8907 PT DOC NO EVENTS ON DISCHARG: HCPCS | Performed by: INTERNAL MEDICINE

## 2023-02-01 PROCEDURE — 45380 COLONOSCOPY AND BIOPSY: CPT | Performed by: INTERNAL MEDICINE

## 2023-02-01 RX ORDER — MESALAMINE 0.38 G/1
TAKE 4 CAPSULES BY MOUTH EVERY MORNING CAPSULE, EXTENDED RELEASE ORAL
Qty: 360 CAPSULE | Refills: 2 | Status: ACTIVE | COMMUNITY

## 2023-02-01 RX ORDER — EVOLOCUMAB 140 MG/ML
1 ML INJECTION, SOLUTION SUBCUTANEOUS EVERY 2 WEEKS
Status: ACTIVE | COMMUNITY

## 2023-02-01 RX ORDER — MERCAPTOPURINE 50 MG/1
TAKE 1 TABLET BY MOUTH ONCE DAILY TABLET ORAL
Qty: 30 | Refills: 5 | Status: ACTIVE | COMMUNITY
Start: 2019-05-22

## 2023-02-01 RX ORDER — BEMPEDOIC ACID AND EZETIMIBE 180; 10 MG/1; MG/1
1 TABLET TABLET, FILM COATED ORAL ONCE A DAY
Status: ACTIVE | COMMUNITY

## 2023-02-01 RX ORDER — DONEPEZIL HYDROCHLORIDE 5 MG/1
1 TABLET ON THE TONGUE AND ALLOW TO DISSOLVE AT BEDTIME TABLET, FILM COATED ORAL ONCE A DAY
Refills: 0 | Status: ACTIVE | COMMUNITY

## 2023-02-01 RX ORDER — CLOPIDOGREL 75 MG/1
1 TABLET TABLET ORAL ONCE A DAY
Refills: 0 | Status: ACTIVE | COMMUNITY
Start: 2017-05-06

## 2023-02-01 RX ORDER — RABEPRAZOLE SODIUM 20 MG/1
1 TABLET TABLET, DELAYED RELEASE ORAL TWICE A DAY
Refills: 0 | Status: ACTIVE | COMMUNITY

## 2023-03-21 PROBLEM — 235746007 INDETERMINATE COLITIS: Status: ACTIVE | Noted: 2023-03-21

## 2023-03-23 ENCOUNTER — OFFICE VISIT (OUTPATIENT)
Dept: URBAN - METROPOLITAN AREA CLINIC 113 | Facility: CLINIC | Age: 63
End: 2023-03-23
Payer: COMMERCIAL

## 2023-03-23 VITALS
DIASTOLIC BLOOD PRESSURE: 71 MMHG | RESPIRATION RATE: 20 BRPM | HEART RATE: 67 BPM | BODY MASS INDEX: 24.1 KG/M2 | HEIGHT: 68 IN | TEMPERATURE: 97.5 F | WEIGHT: 159 LBS | SYSTOLIC BLOOD PRESSURE: 120 MMHG

## 2023-03-23 DIAGNOSIS — D12.5 BENIGN NEOPLASM OF SIGMOID COLON: ICD-10-CM

## 2023-03-23 DIAGNOSIS — K22.70 BARRETT'S ESOPHAGUS DETERMINED BY ENDOSCOPY: ICD-10-CM

## 2023-03-23 DIAGNOSIS — K50.10 CROHN'S DISEASE OF COLON WITHOUT COMPLICATION: ICD-10-CM

## 2023-03-23 DIAGNOSIS — R07.89 ATYPICAL CHEST PAIN: ICD-10-CM

## 2023-03-23 DIAGNOSIS — K21.9 GERD WITHOUT ESOPHAGITIS: ICD-10-CM

## 2023-03-23 PROCEDURE — 99214 OFFICE O/P EST MOD 30 MIN: CPT | Performed by: INTERNAL MEDICINE

## 2023-03-23 RX ORDER — MESALAMINE 0.38 G/1
TAKE 4 CAPSULES BY MOUTH EVERY MORNING CAPSULE, EXTENDED RELEASE ORAL
Qty: 360 CAPSULE | Refills: 2 | Status: ACTIVE | COMMUNITY

## 2023-03-23 RX ORDER — CLOPIDOGREL 75 MG/1
1 TABLET TABLET ORAL ONCE A DAY
Refills: 0 | Status: ACTIVE | COMMUNITY
Start: 2017-05-06

## 2023-03-23 RX ORDER — RABEPRAZOLE SODIUM 20 MG/1
1 TABLET TABLET, DELAYED RELEASE ORAL TWICE A DAY
Refills: 0 | Status: ACTIVE | COMMUNITY

## 2023-03-23 RX ORDER — BEMPEDOIC ACID AND EZETIMIBE 180; 10 MG/1; MG/1
1 TABLET TABLET, FILM COATED ORAL ONCE A DAY
Status: ACTIVE | COMMUNITY

## 2023-03-23 RX ORDER — MERCAPTOPURINE 50 MG/1
TAKE 1 TABLET BY MOUTH ONCE DAILY TABLET ORAL
Qty: 30 | Refills: 5 | Status: ACTIVE | COMMUNITY
Start: 2019-05-22

## 2023-03-23 RX ORDER — DONEPEZIL HYDROCHLORIDE 5 MG/1
1 TABLET ON THE TONGUE AND ALLOW TO DISSOLVE AT BEDTIME TABLET, FILM COATED ORAL ONCE A DAY
Refills: 0 | Status: ACTIVE | COMMUNITY

## 2023-03-23 RX ORDER — EVOLOCUMAB 140 MG/ML
1 ML INJECTION, SOLUTION SUBCUTANEOUS EVERY 2 WEEKS
Status: ACTIVE | COMMUNITY

## 2023-03-23 NOTE — HPI-TODAY'S VISIT:
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  I have followed him since 1995. He was last seen here on 11/17/22.  In 2015, Dr. Garciakin had been placed on a regimen of Apriso 0.375 mg 4 capsules daily. He had previously not been on a maintenance regimen for several years, and had been self-medicating on an as-needed basis with prednisone. We repeated his colonoscopy on December 3, 2015 and this showed inflammation from the anus to the ascending colon with biopsy showing no dysplasia. He did have 1 tubular adenoma removed at that time. Upper endoscopy was also done on December 3, 2015 and this showed a hiatal hernia and gastritis. These findings were consistent with those from his prior upper endoscopy in 2005.  Notably, the patient had several coronary artery stents placed by Dr. Singh in May 2017 and is now on Plavix.  His mother does have a history of coronary artery disease and had a myocardial infarction in the past.   In early October 2017, the patient developed acute left-sided abdominal pain which was severe, and a CT scan showed inflammation in the left lower quadrant with an abscess. This was felt to be due to a localized perforation. The abscess was not drained, and the patient was placed on ciprofloxacin and Flagyl. Repeat CT scan on November 9, 2017 showed virtual resolution of that abscess. He felt much better at that time. We discussed biologic agents, immunosuppressives, etc. We did discuss possible surgical intervention (segmental surgical colectomy by Dr. Santos) as well. We also talked about the need to get a surveillance colonoscopy, although we elected to defer this until he was a few months further out from his coronary event. We did give him a lab slip to have lab work performed, but this laboratory was not done. Ultimately, we elected to place him on 6-mercaptopurine 50 mg a day. He called me in the interim with complaints of left lower quadrant pain. A repeat CT scan showed colitis, but no abscess. I put him back on prednisone 30 mg a day, and his symptoms completely abated.   The patient had a surveillance colonoscopy performed on September 17, 2020.  This showed a 6 mm hyperplastic polyp in the sigmoid, pseudopolyps in the hepatic flexure, sigmoid colon diverticulosis, but no active colitis.  Random colon biopsies showed patchy mildly active colitis without dysplasia.  At the time of his 5/20/221 visit, he had been having episodes of atypical chest and epigastric pain which were quite severe.  He had cardiac catheterization by Dr. Nowak and all of his stents were open; there was no evidence of significant active coronary artery disease.    He has been most recently maintained on 6-MP 50 mg daily and Apriso, and this regimen was successful in maintaining remission.  He was taking AcipHex 20 mg twice daily for reflux.  His last upper endoscopy for [facility]'s surveillance had been in 2017, so he was scheduled for a repeat upper endoscopy. This was done on 5/27/21 and showed an irregular Z-line; biopsies showed reflux-type changes but no Cordova's and no dysplasia.   He did not come for his post-procedure follow up visit.  In fall 2022, he developed lower abdominal pain, and was noted to have a white blood cell count of 16,000 with a left shift.  He was given IV fluids, a gram of ceftriaxone IV, and was placed on oral ciprofloxacin and Flagyl.  A CT scan of the abdomen and pelvis showed thickening of the cecal area focally, consistent with the patient's abdominal pain complaints.  The possibility of Crohn's was raised, although neoplasm not excluded.  He was doing better by the tume of his 11/17/22 office visit, with minimal abdominal pain, no diarrhea, no fever, chills, or sweats.  He did stop taking the Flagyl because of a metallic taste in his mouth and other mild GI side effects, but completed his course of Cipro. He was subsequenty scheduled for colonoscopy.  Colonoscopy was performed on February 1, 2023.  There is a 6 mm sigmoid colon polyp which was removed.  This was hyperplastic.  Inflammation was noted in the mid ascending and hepatic flexure areas.  Biopsy showed mild colitis consistent with ulcerative colitis, without dysplasia.  The terminal ileum was normal.  Grade 1 internal hemorrhoids were noted.  Pseudopolyps were noted in the hepatic flexure and proximal transverse colon consistent with old inflammation.  Random colon biopsies did not show any evidence of dysplasia.  The overall histologic impression was of biopsies consistent with ulcerative colitis with mild activity in the hepatic flexure area and an active colitis in the ascending, with a hyperplastic polyp in the sigmoid.  There is no dysplasia seen.  The patient is not having any issues at present.  He denies abdominal pain, diarrhea, bloody stools, etc.  He had no abdominal pain.  He is now taking Dexilant for his reflux complaints.  He is not due for surveillance upper endoscopy until 2024.

## 2023-03-25 ENCOUNTER — DASHBOARD ENCOUNTERS (OUTPATIENT)
Age: 63
End: 2023-03-25

## 2023-03-25 PROBLEM — 50440006: Status: ACTIVE | Noted: 2023-03-25

## 2023-07-10 ENCOUNTER — ERX REFILL RESPONSE (OUTPATIENT)
Dept: URBAN - METROPOLITAN AREA CLINIC 113 | Facility: CLINIC | Age: 63
End: 2023-07-10

## 2023-07-10 RX ORDER — MESALAMINE 0.38 G/1
TAKE 4 CAPSULES BY MOUTH EVERY MORNING CAPSULE, EXTENDED RELEASE ORAL
Qty: 360 CAPSULE | Refills: 2 | OUTPATIENT

## 2025-01-07 ENCOUNTER — ERX REFILL RESPONSE (OUTPATIENT)
Dept: URBAN - METROPOLITAN AREA CLINIC 113 | Facility: CLINIC | Age: 65
End: 2025-01-07

## 2025-01-07 RX ORDER — MESALAMINE 0.38 G/1
TAKE 4 CAPSULES BY MOUTH EVERY MORNING CAPSULE, EXTENDED RELEASE ORAL
Qty: 360 CAPSULE | Refills: 2 | OUTPATIENT